# Patient Record
Sex: MALE | Race: OTHER | NOT HISPANIC OR LATINO
[De-identification: names, ages, dates, MRNs, and addresses within clinical notes are randomized per-mention and may not be internally consistent; named-entity substitution may affect disease eponyms.]

---

## 2020-01-25 ENCOUNTER — TRANSCRIPTION ENCOUNTER (OUTPATIENT)
Age: 60
End: 2020-01-25

## 2020-10-12 ENCOUNTER — APPOINTMENT (OUTPATIENT)
Dept: POPULATION HEALTH | Facility: CLINIC | Age: 60
End: 2020-10-12
Payer: COMMERCIAL

## 2020-10-12 DIAGNOSIS — Z81.8 FAMILY HISTORY OF OTHER MENTAL AND BEHAVIORAL DISORDERS: ICD-10-CM

## 2020-10-12 DIAGNOSIS — Z78.9 OTHER SPECIFIED HEALTH STATUS: ICD-10-CM

## 2020-10-12 DIAGNOSIS — Z85.46 PERSONAL HISTORY OF MALIGNANT NEOPLASM OF PROSTATE: ICD-10-CM

## 2020-10-12 PROBLEM — Z00.00 ENCOUNTER FOR PREVENTIVE HEALTH EXAMINATION: Status: ACTIVE | Noted: 2020-10-12

## 2020-10-12 PROCEDURE — 99205 OFFICE O/P NEW HI 60 MIN: CPT | Mod: 95

## 2020-10-12 NOTE — ASSESSMENT
[FreeTextEntry1] : 58 yo m w PM for prostate ca for telehealth visit regarding longstanding exposure to the [place] plume to which he was likely exposed via contaminated soil, contaminated vegetables and possibly through vapor intrusion and contaminated drinking water.\par \par Of the contaminants in the plume, the ones most readily linked to increased risk of prostate cancer following exposure are PCBs and Cd, both of which have been shown to be in the plume at elevated levels. Only about 10% of prostate ca cases happen at his age and he has no obvious RF beyond his gender.\par \par Among the cancers known to be associated with contaminants found in the soil at high levels are: Bladder, brain, breast, kidney, lung, hematopoietic, melanoma, nasal, sinus, prostate. TCE and cadmium, both at high concentrations in the plume are known causes of kidney cancer. Furthermore, dioxin-like PCBs are linked to increases in risk of all forms of cancer as is radiation from radium.\par Among the non-cancer health outcomes include: Hepatocellular injury, kidney injury, neurological injury, autoimmune disease, particularly scleroderma, thyroid, reproductive (fetal cardiac defects, sperm impairment).\par \par \par While largely sx free, it is valuable to evaluate for selected health effects including hematopoietic, hepatic, kidney and thyroid effects for the purposes of establishing a base line. Subsequently, such issues can be evaluated as guided by signs or symptoms. Given the pandemic, I'm concerned about sending into health facilities at this time so will hold off for now. Pt will send recent lab work for my review.\par  \par Extensive discussion about these health effects, the importance of vigilance for s/sxs of these, good f/u care with PMD, H/O and other specialists, and following through on routine cancer screenings. Despite the risks that exposure to these pollutants pose, I advised that vigilance about was more appropriate than panic. LSM and preventive strategies. Exposure reduction strategies including water filtration, avoidance of soil handling, etc.\par I further advised that yearly assessment is warranted. If/when further exposure information becomes available, the plan/guidance will be adjusted accordingly.\par All questions answered. Pt understood and agreed with plan.\par RTC 1 y\par

## 2020-10-12 NOTE — HISTORY OF PRESENT ILLNESS
[Home] : at home, [unfilled] , at the time of the visit. [Medical Office: ([facility])___] : at the medical office located in  [Verbal consent obtained from patient] : the patient, [unfilled] [FreeTextEntry1] : 58 yo m w PMH for prostate ca for telehealth visit regarding longstanding exposure to the [place] plume and is concerned about its impact on overall health.\par \par Dx 10/2015 radiation no chemo, no surg. Close f/u currently 6 months and currently ca free\par No FHx of Prostate ca, non-smoker, no strong dietary components (that increase or decrease risk), no use of 5-ASR, no hx of prostate dz, etc.\par \par Colonoscopy not UTD; has PMD sees PRN and for yearly wellness\par \par \par Moved to [place] December 2004 been there ever since.\par House with a yard.  Vegetable garden every year. In ground with water from the house.\par No soil, water, IAQ.\par \par In the house with him since that time:\par wife--good health. \par Two children-good health\par \par Spent a lot of time with kids in the park when the children were young.\par \par Never smoker\par \par Occ hx:  (outside of [place]) \par \par No unusual activities/hobbies/exposures\par \par

## 2022-02-11 ENCOUNTER — TRANSCRIPTION ENCOUNTER (OUTPATIENT)
Age: 62
End: 2022-02-11

## 2025-01-23 ENCOUNTER — NON-APPOINTMENT (OUTPATIENT)
Age: 65
End: 2025-01-23